# Patient Record
Sex: MALE | Race: WHITE | NOT HISPANIC OR LATINO | Employment: OTHER | URBAN - METROPOLITAN AREA
[De-identification: names, ages, dates, MRNs, and addresses within clinical notes are randomized per-mention and may not be internally consistent; named-entity substitution may affect disease eponyms.]

---

## 2017-01-06 ENCOUNTER — TELEPHONE (OUTPATIENT)
Dept: PULMONOLOGY | Facility: HOSPICE | Age: 72
End: 2017-01-06

## 2017-01-06 DIAGNOSIS — C34.90 ADENOCARCINOMA OF LUNG, UNSPECIFIED LATERALITY (HCC): ICD-10-CM

## 2017-01-06 DIAGNOSIS — J43.9 PULMONARY EMPHYSEMA, UNSPECIFIED EMPHYSEMA TYPE (HCC): ICD-10-CM

## 2017-01-11 ENCOUNTER — TELEPHONE (OUTPATIENT)
Dept: PULMONOLOGY | Facility: HOSPICE | Age: 72
End: 2017-01-11

## 2017-01-11 NOTE — TELEPHONE ENCOUNTER
Spoke to pt and he is going to be getting CT Scan done at [facility]. Called [facility] and got fax number. Faxed orders for CT over to [phone]

## 2017-04-07 ENCOUNTER — HOSPITAL ENCOUNTER (OUTPATIENT)
Dept: RADIOLOGY | Facility: MEDICAL CENTER | Age: 72
End: 2017-04-07

## 2017-04-10 ENCOUNTER — HOSPITAL ENCOUNTER (OUTPATIENT)
Dept: RADIOLOGY | Facility: MEDICAL CENTER | Age: 72
End: 2017-04-10

## 2017-04-10 ENCOUNTER — OFFICE VISIT (OUTPATIENT)
Dept: PULMONOLOGY | Facility: HOSPICE | Age: 72
End: 2017-04-10
Payer: MEDICARE

## 2017-04-10 VITALS
HEIGHT: 70 IN | DIASTOLIC BLOOD PRESSURE: 62 MMHG | HEART RATE: 69 BPM | SYSTOLIC BLOOD PRESSURE: 118 MMHG | WEIGHT: 191 LBS | BODY MASS INDEX: 27.35 KG/M2 | RESPIRATION RATE: 16 BRPM | TEMPERATURE: 98.2 F

## 2017-04-10 DIAGNOSIS — C34.01 MALIGNANT NEOPLASM OF HILUS OF RIGHT LUNG (HCC): ICD-10-CM

## 2017-04-10 DIAGNOSIS — J44.9 CHRONIC OBSTRUCTIVE PULMONARY DISEASE, UNSPECIFIED COPD TYPE (HCC): ICD-10-CM

## 2017-04-10 DIAGNOSIS — J96.11 CHRONIC HYPOXEMIC RESPIRATORY FAILURE (HCC): ICD-10-CM

## 2017-04-10 PROCEDURE — 1036F TOBACCO NON-USER: CPT | Performed by: INTERNAL MEDICINE

## 2017-04-10 PROCEDURE — G8419 CALC BMI OUT NRM PARAM NOF/U: HCPCS | Performed by: INTERNAL MEDICINE

## 2017-04-10 PROCEDURE — 3017F COLORECTAL CA SCREEN DOC REV: CPT | Mod: 8P | Performed by: INTERNAL MEDICINE

## 2017-04-10 PROCEDURE — 99214 OFFICE O/P EST MOD 30 MIN: CPT | Performed by: INTERNAL MEDICINE

## 2017-04-10 PROCEDURE — G8432 DEP SCR NOT DOC, RNG: HCPCS | Performed by: INTERNAL MEDICINE

## 2017-04-10 PROCEDURE — 1101F PT FALLS ASSESS-DOCD LE1/YR: CPT | Mod: 8P | Performed by: INTERNAL MEDICINE

## 2017-04-10 PROCEDURE — 4040F PNEUMOC VAC/ADMIN/RCVD: CPT | Performed by: INTERNAL MEDICINE

## 2017-04-10 RX ORDER — TIOTROPIUM BROMIDE 18 UG/1
18 CAPSULE ORAL; RESPIRATORY (INHALATION)
COMMUNITY
Start: 2016-10-01

## 2017-04-10 RX ORDER — ALBUTEROL SULFATE 90 UG/1
2 AEROSOL, METERED RESPIRATORY (INHALATION)
COMMUNITY
Start: 2016-10-01

## 2017-04-10 RX ORDER — TAMSULOSIN HYDROCHLORIDE 0.4 MG/1
CAPSULE ORAL
Refills: 1 | COMMUNITY
Start: 2017-02-06

## 2017-04-10 RX ORDER — MAG HYDROX/ALUMINUM HYD/SIMETH 400-400-40
450 SUSPENSION, ORAL (FINAL DOSE FORM) ORAL
COMMUNITY

## 2017-04-10 RX ORDER — TAMSULOSIN HYDROCHLORIDE 0.4 MG/1
0.4 CAPSULE ORAL
COMMUNITY
Start: 2016-10-01

## 2017-04-10 RX ORDER — FINASTERIDE 5 MG/1
5 TABLET, FILM COATED ORAL
COMMUNITY
Start: 2016-10-01

## 2017-04-10 NOTE — PROGRESS NOTES
Chief Complaint   Patient presents with   • Follow-Up     last seen about 2 years ago       HPI: This patient is a 72 y.o. Male who returns after a prolonged hiatus for follow-up of COPD and lung cancer. He underwent right thoracoscopy with segmentectomy of the right lower in August 2015. Unfortunately has had progressive disease with PET scan March 2017 showing hypermetabolic lesion in the right lower lobe infrahilar region, right upper lobe and subcarinal node. FNA biopsy in March 2017 confirmed adenocarcinoma. He saw Dr. Ganser and was deemed not a surgical candidate due to to his COPD. He has seen both oncology and radiation oncology and is considering whether he wants to pursue any treatment. His exertional dyspnea has been stable. He denies cough, wheezing, chest tightness or edema. He is compliant with Qvar 80 µg and Spiriva. He denies AECOPD over the past year. Uses supplemental oxygen with exertion and nocturnally. He wonders whether his emphysema may worsen with chemoradiation.      Past Medical History   Diagnosis Date   • Hypertension    • Other emphysema (CMS-[facility])    • Breath shortness 7/6/2015     current problem   • COPD (chronic obstructive pulmonary disease) (CMS-[facility])    • Cancer (CMS-[facility]) 7/2015     right lung   • Dental disorder      upper and lower       Social History     Social History   • Marital Status: Single     Spouse Name: N/A   • Number of Children: N/A   • Years of Education: N/A     Occupational History   • Not on file.     Social History Main Topics   • Smoking status: Former Smoker -- 1.00 packs/day for 50 years     Types: Cigarettes     Quit date: 01/01/2011   • Smokeless tobacco: Never Used   • Alcohol Use: Yes      Comment: 3x week-beer,wine,whiskey   • Drug Use: No   • Sexual Activity: Not on file     Other Topics Concern   • Not on file     Social History Narrative       Family History   Problem Relation Age of Onset   • Cancer Brother    • No Known Problems Mother      Unknown  "  • Breast Cancer Father        Current Outpatient Prescriptions on File Prior to Visit   Medication Sig Dispense Refill   • beclomethasone (QVAR) 80 MCG/ACT inhaler Inhale 1 Puff by mouth 2 times a day.     • finasteride (PROSCAR) 5 MG TABS Take 5 mg by mouth every day.     • hydrocodone-acetaminophen (NORCO) 5-325 MG Tab per tablet Take 1-2 Tabs by mouth every four hours as needed ((Pain scale 4 - 6)). 30 Tab 0   • tiotropium (SPIRIVA HANDIHALER) 18 MCG CAPS Inhale 18 mcg by mouth every day.     • albuterol (VENTOLIN OR PROVENTIL) 108 (90 BASE) MCG/ACT AERS Inhale 2 Puffs by mouth every 6 hours as needed for Shortness of Breath.     • losartan (COZAAR) 50 MG TABS Take 50 mg by mouth every day.     • aspirin (ASA) 81 MG CHEW chewable tablet Take 81 mg by mouth every day.     • SAW PALMETTO, SERENOA REPENS, PO Take  by mouth every day.       No current facility-administered medications on file prior to visit.       Allergies: Review of patient's allergies indicates no known allergies.    ROS:   Constitutional: Denies fevers, chills, night sweats, fatigue or weight loss  Eyes: Denies vision loss, pain, drainage, double vision  Ears, Nose, Throat: Denies earache, difficulty hearing, tinnitus, nasal congestion, hoarseness  Cardiovascular: Denies chest pain, tightness, palpitations, orthopnea or edema  Respiratory: As in history of present illness  Sleep: Denies daytime sleepiness, snoring, apneas, insomnia, morning headaches  GI: Denies heartburn, dysphagia, nausea, abdominal pain, diarrhea or constipation  : Denies frequent urination, hematuria, discharge or painful urination  Musculoskeletal: Denies back pain, painful joints, sore muscles  Neurological: Denies weakness or headaches, + anxiety  Skin: No rashes    Blood pressure 118/62, pulse 69, temperature 36.8 °C (98.2 °F), resp. rate 16, height 1.778 m (5' 10\"), weight 86.637 kg (191 lb).  Multi-Ox Readings  Multi Ox #1     O2 sat % at rest     O2 sat % on " exertion     O2 sat average on exertion     Multi Ox #2     O2 sat % at rest     O2 sat % on exertion     O2 sat average on exertion       Oxygen Use 3   Oxygen Frequency With exertion and nocturnal   Duration of need Lifetime   Is the patient mobile within the home? Yes   CPAP Use?     BIPAP Use?     Servo Titration         Physical Exam:  Appearance: Well-nourished, well-developed, in no acute distress  HEENT: Normocephalic, atraumatic, white sclera, PERRLA, oropharynx clear  Neck: No adenopathy or masses  Respiratory: no intercostal retractions or accessory muscle use  Lungs auscultation: Clear to auscultation bilaterally, diminished breath sounds  Cardiovascular: Regular rate rhythm. No murmurs, rubs or gallops.  No LE edema  Abdomen: soft, nondistended  Gait: Normal  Digits: No clubbing, cyanosis  Motor: No focal deficits  Orientation: Oriented to time, person and place    Diagnosis:  1. Chronic obstructive pulmonary disease, unspecified COPD type (CMS-HCC)  AMB PULMONARY FUNCTION TEST/LAB   2. Chronic hypoxemic respiratory failure (CMS-HCC)     3. 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 Clinically he appears stable however we discussed the need for updated PFTs to accurately assess his lung function. Radiation treatment could certainly worsen his pulmonary status. He is not a surgical candidate on account of his COPD.   As he is considering his treatment options, I recommend updating pulmonary function testing with close follow-up. We could certainly optimizes inhaled bronchodilators if there is evidence of decline. Continue Qvar and Spiriva in the interim.  Return for after testing.

## 2017-04-10 NOTE — MR AVS SNAPSHOT
"        Cristian Cabrera   4/10/2017 12:40 PM   Office Visit   MRN: [id number]    Department:  [facility]   Dept Phone:  [phone]    Description:  Male : 1945   Provider:  Ashwini Dela Cruz M.D.           Reason for Visit     Follow-Up last seen about 2 years ago      Allergies as of 4/10/2017     No Known Allergies      You were diagnosed with     Chronic obstructive pulmonary disease, unspecified COPD type (CMS-HCC)   [0286698]       Chronic hypoxemic respiratory failure (CMS-HCC)   [028258]       Malignant neoplasm of hilus of right lung (CMS-HCC)   [0368910]         Vital Signs     Blood Pressure Pulse Temperature Respirations Height Weight    118/62 mmHg 69 36.8 °C (98.2 °F) 16 1.778 m (5' 10\") 86.637 kg (191 lb)    Body Mass Index Smoking Status                27.41 kg/m2 Former Smoker          Basic Information     Date Of Birth Sex Race Ethnicity Preferred Language    1945 Male White Non- English      Your appointments     Apr 10, 2017  1:10 PM   Nm-Hlsotpq-Sdafdiv Film X-Ray with DX FOREIGN OUTSIDE FILM   IMAGING SUPPORT [facility] ([facility])    [address] [facility]  [place] [address]   [phone]            2017  8:00 AM   Pulmonary Function Test with PFT-RM2   [facility] (--)    [address] [place] [id number] [phone]            2017  9:00 AM   Established Patient Pul with Ashwini Dela Cruz M.D.   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   7/10/2015 - Present    Pneumothorax after biopsy J95.811   7/10/2015 - Present    Malignant neoplasm of lung (CMS-HCC) C34.90   2015 - Present      Health Maintenance        Date Due Completion Dates    IMM DTaP/Tdap/Td Vaccine (1 - Tdap) 1965 ---    COLONOSCOPY " 2/11/1996 ---    IMM ZOSTER VACCINE 2/11/2006 ---    IMM PNEUMOCOCCAL 65+ (ADULT) LOW/MEDIUM RISK SERIES (2 of 2 - PCV13) 10/1/2015 10/1/2014            Current Immunizations     Influenza TIV (IM) 10/1/2016, 10/1/2014    Pneumococcal polysaccharide vaccine (PPSV-23) 10/1/2014      Below and/or attached are the medications your provider expects you to take. Review all of your home medications and newly ordered medications with your provider and/or pharmacist. Follow medication instructions as directed by your provider and/or pharmacist. Please keep your medication list with you and share with your provider. Update the information when medications are discontinued, doses are changed, or new medications (including over-the-counter products) are added; and carry medication information at all times in the event of emergency situations     Allergies:  No Known Allergies          Medications  Valid as of: April 10, 2017 -  1:08 PM    Generic Name Brand Name Tablet Size Instructions for use    Albuterol Sulfate (Aero Soln) VENTOLIN or PROVENTIL 108 (90 BASE) MCG/ACT Inhale 2 Puffs by mouth every 6 hours as needed for Shortness of Breath.        Albuterol Sulfate (Aero Soln) albuterol 108 (90 BASE) MCG/ACT 2 Puffs by Nebulization route.        Aspirin (Chew Tab) ASA 81 MG Take 81 mg by mouth every day.        Beclomethasone Dipropionate (Aero Soln) QVAR 80 MCG/ACT Inhale 1 Puff by mouth 2 times a day.        Beclomethasone Dipropionate (Aero Soln) QVAR 80 MCG/ACT 2 Puffs by Nebulization route.        Finasteride (Tab) PROSCAR 5 MG Take 5 mg by mouth every day.        Finasteride (Tab) PROSCAR 5 MG Take 5 mg by mouth.        Hydrocodone-Acetaminophen (Tab) NORCO 5-325 MG Take 1-2 Tabs by mouth every four hours as needed ((Pain scale 4 - 6)).        Losartan Potassium (Tab) COZAAR 50 MG Take 50 mg by mouth every day.        Saw Palmetto (Serenoa repens)   Take  by mouth every day.        Saw Palmetto (Serenoa repens) (Cap)  Saw Palmetto 450 MG Take 450 mg by mouth.        Tamsulosin HCl (Cap) FLOMAX 0.4 MG Take 0.4 mg by mouth.        Tamsulosin HCl (Cap) FLOMAX 0.4 MG TAKE ONE CAPSULE BY MOUTH EVERY DAY 30 MINUTES AFTER THE SAME MEAL        Tiotropium Bromide Monohydrate (Cap) SPIRIVA 18 MCG Inhale 18 mcg by mouth every day.        Tiotropium Bromide Monohydrate (Cap) SPIRIVA 18 MCG 18 mcg by Nebulization route.        .                 Medicines prescribed today were sent to:     [facility]/PHARMACY #[id number] - [place], [place] - [address]    [address] [facility] [address]    Phone: [phone] Fax: [phone]    Open 24 Hours?: No      Medication refill instructions:       If your prescription bottle indicates you have medication refills left, it is not necessary to call your provider’s office. Please contact your pharmacy and they will refill your medication.    If your prescription bottle indicates you do not have any refills left, you may request refills at any time through one of the following ways: The online Palm system (except Urgent Care), by calling your provider’s office, or by asking your pharmacy to contact your provider’s office with a refill request. Medication refills are processed only during regular business hours and may not be available until the next business day. Your provider may request additional information or to have a follow-up visit with you prior to refilling your medication.   *Please Note: Medication refills are assigned a new Rx number when refilled electronically. Your pharmacy may indicate that no refills were authorized even though a new prescription for the same medication is available at the pharmacy. Please request the medicine by name with the pharmacy before contacting your provider for a refill.        Your To Do List     Future Labs/Procedures Complete By Expires    AMB PULMONARY FUNCTION TEST/LAB  As directed 4/10/2018         Palm Access Code: [email or web address]  Expires:  5/10/2017 12:09 PM    [employer]  A secure, online tool to manage your health information     [employer]’s [employer]® is a secure, online tool that connects you to your personalized health information from the privacy of your home -- day or night - making it very easy for you to manage your healthcare. Once the activation process is completed, you can even access your medical information using the [employer] palma, which is available for free in the Apple Palma store or Google Play store.     [employer] provides the following levels of access (as shown below):   My Chart Features   [facility] Primary Care Doctor [facility]  Specialists [facility]  Urgent  Care Non-[facility]  Primary Care  Doctor   Email your healthcare team securely and privately 24/7 X X X    Manage appointments: schedule your next appointment; view details of past/upcoming appointments X      Request prescription refills. X      View recent personal medical records, including lab and immunizations X X X X   View health record, including health history, allergies, medications X X X X   Read reports about your outpatient visits, procedures, consult and ER notes X X X X   See your discharge summary, which is a recap of your hospital and/or ER visit that includes your diagnosis, lab results, and care plan. X X       How to register for [employer]:  1. Go to  [email or web address].  2. Click on the Sign Up Now box, which takes you to the New Member Sign Up page. You will need to provide the following information:  a. Enter your [employer] Access Code exactly as it appears at the top of this page. (You will not need to use this code after you’ve completed the sign-up process. If you do not sign up before the expiration date, you must request a new code.)   b. Enter your date of birth.   c. Enter your home email address.   d. Click Submit, and follow the next screen’s instructions.  3. Create a [employer] ID. This will be your [employer] login ID and cannot be changed, so think of one  that is secure and easy to remember.  4. Create a [employer] password. You can change your password at any time.  5. Enter your Password Reset Question and Answer. This can be used at a later time if you forget your password.   6. Enter your e-mail address. This allows you to receive e-mail notifications when new information is available in [employer].  7. Click Sign Up. You can now view your health information.    For assistance activating your [employer] account, call [phone]

## 2017-04-11 ENCOUNTER — TELEPHONE (OUTPATIENT)
Dept: PULMONOLOGY | Facility: HOSPICE | Age: 72
End: 2017-04-11

## 2017-04-11 NOTE — TELEPHONE ENCOUNTER
Faxed last chart note to Josh at [facility] [phone]. [phone]

## 2017-04-13 ENCOUNTER — OFFICE VISIT (OUTPATIENT)
Dept: PULMONOLOGY | Facility: HOSPICE | Age: 72
End: 2017-04-13
Payer: MEDICARE

## 2017-04-13 ENCOUNTER — NON-PROVIDER VISIT (OUTPATIENT)
Dept: PULMONOLOGY | Facility: HOSPICE | Age: 72
End: 2017-04-13
Payer: MEDICARE

## 2017-04-13 VITALS
SYSTOLIC BLOOD PRESSURE: 118 MMHG | WEIGHT: 188 LBS | RESPIRATION RATE: 16 BRPM | BODY MASS INDEX: 26.92 KG/M2 | TEMPERATURE: 97.7 F | HEART RATE: 76 BPM | DIASTOLIC BLOOD PRESSURE: 80 MMHG | OXYGEN SATURATION: 92 % | HEIGHT: 70 IN

## 2017-04-13 DIAGNOSIS — J44.9 CHRONIC OBSTRUCTIVE PULMONARY DISEASE, UNSPECIFIED COPD TYPE (HCC): ICD-10-CM

## 2017-04-13 DIAGNOSIS — C34.01 MALIGNANT NEOPLASM OF HILUS OF RIGHT LUNG (HCC): ICD-10-CM

## 2017-04-13 PROCEDURE — 4040F PNEUMOC VAC/ADMIN/RCVD: CPT | Performed by: INTERNAL MEDICINE

## 2017-04-13 PROCEDURE — 94729 DIFFUSING CAPACITY: CPT | Performed by: INTERNAL MEDICINE

## 2017-04-13 PROCEDURE — 1101F PT FALLS ASSESS-DOCD LE1/YR: CPT | Mod: 8P | Performed by: INTERNAL MEDICINE

## 2017-04-13 PROCEDURE — 99214 OFFICE O/P EST MOD 30 MIN: CPT | Performed by: INTERNAL MEDICINE

## 2017-04-13 PROCEDURE — 94060 EVALUATION OF WHEEZING: CPT | Performed by: INTERNAL MEDICINE

## 2017-04-13 PROCEDURE — 1036F TOBACCO NON-USER: CPT | Performed by: INTERNAL MEDICINE

## 2017-04-13 PROCEDURE — 94726 PLETHYSMOGRAPHY LUNG VOLUMES: CPT | Performed by: INTERNAL MEDICINE

## 2017-04-13 PROCEDURE — G8419 CALC BMI OUT NRM PARAM NOF/U: HCPCS | Performed by: INTERNAL MEDICINE

## 2017-04-13 PROCEDURE — 3017F COLORECTAL CA SCREEN DOC REV: CPT | Mod: 8P | Performed by: INTERNAL MEDICINE

## 2017-04-13 PROCEDURE — G8432 DEP SCR NOT DOC, RNG: HCPCS | Performed by: INTERNAL MEDICINE

## 2017-04-13 ASSESSMENT — PULMONARY FUNCTION TESTS
FEV1/FVC_PERCENT_PREDICTED: 108
FEV1/FVC: 79
FEV1: 3.26
FEV1: 3.31
FVC: 4.19
FEV1_PERCENT_PREDICTED: 101
FEV1_PERCENT_PREDICTED: 103
FVC_PERCENT_PREDICTED: 93
FVC_PERCENT_PREDICTED: 95
FVC_PREDICTED: 4.38
FEV1/FVC_PERCENT_CHANGE: 100
FEV1_PERCENT_CHANGE: 1
FEV1/FVC_PERCENT_PREDICTED: 109
FEV1/FVC: 79
FVC: 4.11
FEV1/FVC_PERCENT_PREDICTED: 73
FEV1_PREDICTED: 3.21
FEV1_PERCENT_CHANGE: 1

## 2017-04-13 NOTE — MR AVS SNAPSHOT
"        Cristian Cabrera   2017 3:40 PM   Office Visit   MRN: [id number]    Department:  [facility]   Dept Phone:  [phone]    Description:  Male : 1945   Provider:  Ashwini Dela Cruz M.D.           Reason for Visit     Results PFT results.      Allergies as of 2017     No Known Allergies      You were diagnosed with     Chronic obstructive pulmonary disease, unspecified COPD type (CMS-HCC)   [[id number]]       Malignant neoplasm of hilus of right lung (CMS-HCC)   [9759725]         Vital Signs     Blood Pressure Pulse Temperature Respirations Height Weight    118/80 mmHg 76 36.5 °C (97.7 °F) 16 1.778 m (5' 10\") 85.276 kg (188 lb)    Body Mass Index Oxygen Saturation Smoking Status             26.98 kg/m2 92% Former Smoker         Basic Information     Date Of Birth Sex Race Ethnicity Preferred Language    1945 Male White Non- English      Problem List              ICD-10-CM Priority Class Noted - Resolved    Neoplasm of uncertain behavior of trachea, bronchus, 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   7/10/2015 - Present    Pneumothorax after biopsy J95.811   7/10/2015 - Present    Malignant neoplasm of lung (CMS-HCC) C34.90   2015 - Present      Health Maintenance        Date Due Completion Dates    IMM DTaP/Tdap/Td Vaccine (1 - Tdap) 1965 ---    COLONOSCOPY 1996 ---    IMM ZOSTER VACCINE 2006 ---    IMM PNEUMOCOCCAL 65+ (ADULT) LOW/MEDIUM RISK SERIES (2 of 2 - PCV13) 10/1/2015 10/1/2014            Current Immunizations     Influenza TIV (IM) 10/1/2016, 10/1/2014    Pneumococcal polysaccharide vaccine (PPSV-23) 10/1/2014      Below and/or attached are the medications your provider expects you to take. Review all of your home medications and newly ordered medications with your provider and/or pharmacist. Follow medication instructions as directed by your provider and/or pharmacist. Please keep your medication list with you and share with your provider. Update the information when " medications are discontinued, doses are changed, or new medications (including over-the-counter products) are added; and carry medication information at all times in the event of emergency situations     Allergies:  No Known Allergies          Medications  Valid as of: April 13, 2017 -  4:03 PM    Generic Name Brand Name Tablet Size Instructions for use    Albuterol Sulfate (Aero Soln) VENTOLIN or PROVENTIL 108 (90 BASE) MCG/ACT Inhale 2 Puffs by mouth every 6 hours as needed for Shortness of Breath.        Albuterol Sulfate (Aero Soln) albuterol 108 (90 BASE) MCG/ACT 2 Puffs by Nebulization route.        Aspirin (Chew Tab) ASA 81 MG Take 81 mg by mouth every day.        Beclomethasone Dipropionate (Aero Soln) QVAR 80 MCG/ACT Inhale 1 Puff by mouth 2 times a day.        Beclomethasone Dipropionate (Aero Soln) QVAR 80 MCG/ACT 2 Puffs by Nebulization route.        Finasteride (Tab) PROSCAR 5 MG Take 5 mg by mouth every day.        Finasteride (Tab) PROSCAR 5 MG Take 5 mg by mouth.        Hydrocodone-Acetaminophen (Tab) NORCO 5-325 MG Take 1-2 Tabs by mouth every four hours as needed ((Pain scale 4 - 6)).        Losartan Potassium (Tab) COZAAR 50 MG Take 50 mg by mouth every day.        Mometasone Furo-Formoterol Fum (Aerosol) Mometasone Furo-Formoterol Fum 200-5 MCG/ACT Inhale 2 Puffs by mouth 2 Times a Day.        Saw Palmetto (Serenoa repens)   Take  by mouth every day.        Saw Palmetto (Serenoa repens) (Cap) Saw Palmetto 450 MG Take 450 mg by mouth.        Tamsulosin HCl (Cap) FLOMAX 0.4 MG Take 0.4 mg by mouth.        Tamsulosin HCl (Cap) FLOMAX 0.4 MG TAKE ONE CAPSULE BY MOUTH EVERY DAY 30 MINUTES AFTER THE SAME MEAL        Tiotropium Bromide Monohydrate (Cap) SPIRIVA 18 MCG Inhale 18 mcg by mouth every day.        Tiotropium Bromide Monohydrate (Cap) SPIRIVA 18 MCG 18 mcg by Nebulization route.        .                 Medicines prescribed today were sent to:     [facility]/PHARMACY #[id number] - [place], [place] - [address]    [address] [facility] [address]    Phone: [phone] Fax: [phone]    Open 24 Hours?: No      Medication refill instructions:       If your prescription bottle indicates you have medication refills left, it is not necessary to call your provider’s office. Please contact your pharmacy and they will refill your medication.    If your prescription bottle indicates you do not have any refills left, you may request refills at any time through one of the following ways: The online BadAbroad system (except Urgent Care), by calling your provider’s office, or by asking your pharmacy to contact your provider’s office with a refill request. Medication refills are processed only during regular business hours and may not be available until the next business day. Your provider may request additional information or to have a follow-up visit with you prior to refilling your medication.   *Please Note: Medication refills are assigned a new Rx number when refilled electronically. Your pharmacy may indicate that no refills were authorized even though a new prescription for the same medication is available at the pharmacy. Please request the medicine by name with the pharmacy before contacting your provider for a refill.           BadAbroad Access Code: [email or web address]  Expires: 5/10/2017 12:09 PM    [employer]  A secure, online tool to manage your health information     [employer]’s BadAbroad® is a secure, online tool that connects you to your personalized health information from the privacy of your home -- day or night - making it very easy for you to manage your healthcare. Once the activation process is completed, you can even access your medical information using the BadAbroad palma, which is available for free in the Apple Palma store or Google Play store.     BadAbroad provides the following levels of access (as shown below):   My Chart Features   [facility] Primary Care Doctor [facility]  Specialists  [facility]  Ur[facility]  Care [facility]  Primary Care  Doctor   Email your healthcare team securely and privately 24/7 X X X    Manage appointments: schedule your next appointment; view details of past/upcoming appointments X      Request prescription refills. X      View recent personal medical records, including lab and immunizations X X X X   View health record, including health history, allergies, medications X X X X   Read reports about your outpatient visits, procedures, consult and ER notes X X X X   See your discharge summary, which is a recap of your hospital and/or ER visit that includes your diagnosis, lab results, and care plan. X X       How to register for [email or web address]:  1. Go to  [email or web address].  2. Click on the Sign Up Now box, which takes you to the New Member Sign Up page. You will need to provide the following information:  a. Enter your [email or web address] Access Code exactly as it appears at the top of this page. (You will not need to use this code after you’ve completed the sign-up process. If you do not sign up before the expiration date, you must request a new code.)   b. Enter your date of birth.   c. Enter your home email address.   d. Click Submit, and follow the next screen’s instructions.  3. Create a [email or web address] ID. This will be your [email or web address] login ID and cannot be changed, so think of one that is secure and easy to remember.  4. Create a [email or web address] password. You can change your password at any time.  5. Enter your Password Reset Question and Answer. This can be used at a later time if you forget your password.   6. Enter your e-mail address. This allows you to receive e-mail notifications when new information is available in [email or web address].  7. Click Sign Up. You can now view your health information.    For assistance activating your [email or web address] account, call [phone]

## 2017-04-13 NOTE — PROGRESS NOTES
Chief Complaint   Patient presents with   • Results     PFT results.         HPI: This patient is a 72 y.o. Male who returns for follow-up of COPD and lung cancer. He underwent right thoracoscopy with segmentectomy of the right lower in August 2015. Unfortunately he has progressive disease with PET scan March 2017 showing hypermetabolic lesion in the right lower lobe infrahilar region, right upper lobe and subcarinal node. FNA biopsy in March 2017 confirmed adenocarcinoma. He saw Dr. Ganser and was deemed not a surgical candidate due to his COPD. He has seen both oncology and radiation oncology and is considering whether he wants to pursue any treatment. His exertional dyspnea has been stable. He denies cough, wheezing, chest tightness or edema. He is compliant with Qvar 80 µg and Spiriva. He denies AECOPD over the past year. Uses supplemental oxygen with exertion and nocturnally. PFTs preoperatively showed DLCO 56%. Updated PFTs today show DLCO 41%, normal FEV1 at 3.31 L or 103%, normal total lung capacity 93%.      Past Medical History   Diagnosis Date   • Hypertension    • Other emphysema (CMS-HCC)    • Breath shortness 7/6/2015     current problem   • COPD (chronic obstructive pulmonary disease) (CMS-HCC)    • Cancer (CMS-HCC) 7/2015     right lung   • Dental disorder      upper and lower       Social History     Social History   • Marital Status: Single     Spouse Name: N/A   • Number of Children: N/A   • Years of Education: N/A     Occupational History   • Not on file.     Social History Main Topics   • Smoking status: Former Smoker -- 1.00 packs/day for 50 years     Types: Cigarettes     Quit date: 01/01/2011   • Smokeless tobacco: Never Used   • Alcohol Use: Yes      Comment: 3x week-beer,wine,whiskey   • Drug Use: No   • Sexual Activity: Not on file     Other Topics Concern   • Not on file     Social History Narrative       Family History   Problem Relation Age of Onset   • Cancer Brother    • No Known  Problems Mother      Unknown   • Breast Cancer Father        Current Outpatient Prescriptions on File Prior to Visit   Medication Sig Dispense Refill   • tamsulosin (FLOMAX) 0.4 MG capsule Take 0.4 mg by mouth.     • Saw Palmetto 450 MG Cap Take 450 mg by mouth.     • tiotropium (SPIRIVA HANDIHALER) 18 MCG Cap 18 mcg by Nebulization route.     • hydrocodone-acetaminophen (NORCO) 5-325 MG Tab per tablet Take 1-2 Tabs by mouth every four hours as needed ((Pain scale 4 - 6)). 30 Tab 0   • beclomethasone (QVAR) 80 MCG/ACT inhaler Inhale 1 Puff by mouth 2 times a day.     • finasteride (PROSCAR) 5 MG TABS Take 5 mg by mouth every day.     • tamsulosin (FLOMAX) 0.4 MG capsule TAKE ONE CAPSULE BY MOUTH EVERY DAY 30 MINUTES AFTER THE SAME MEAL  1   • albuterol (PROAIR HFA) 108 (90 BASE) MCG/ACT Aero Soln inhalation aerosol 2 Puffs by Nebulization route.     • beclomethasone (QVAR) 80 MCG/ACT inhaler 2 Puffs by Nebulization route.     • finasteride (PROSCAR) 5 MG Tab Take 5 mg by mouth.     • tiotropium (SPIRIVA HANDIHALER) 18 MCG CAPS Inhale 18 mcg by mouth every day.     • albuterol (VENTOLIN OR PROVENTIL) 108 (90 BASE) MCG/ACT AERS Inhale 2 Puffs by mouth every 6 hours as needed for Shortness of Breath.     • losartan (COZAAR) 50 MG TABS Take 50 mg by mouth every day.     • aspirin (ASA) 81 MG CHEW chewable tablet Take 81 mg by mouth every day.     • SAW PALMETTO, SERENOA REPENS, PO Take  by mouth every day.       No current facility-administered medications on file prior to visit.       Allergies: Review of patient's allergies indicates no known allergies.    ROS:   Constitutional: Denies fevers, chills, night sweats, fatigue or weight loss  Eyes: Denies vision loss, pain, drainage, double vision  Ears, Nose, Throat: Denies earache, difficulty hearing, tinnitus, nasal congestion, hoarseness  Cardiovascular: Denies chest pain, tightness, palpitations, orthopnea or edema  Respiratory: As in history of present  "illness  Sleep: Denies daytime sleepiness, snoring, apneas, insomnia, morning headaches  GI: Denies heartburn, dysphagia, nausea, abdominal pain, diarrhea or constipation  : Denies frequent urination, hematuria, discharge or painful urination  Musculoskeletal: Denies back pain, painful joints, sore muscles  Neurological: Denies weakness or headaches  Skin: No rashes    Blood pressure 118/80, pulse 76, temperature 36.5 °C (97.7 °F), resp. rate 16, height 1.778 m (5' 10\"), weight 85.276 kg (188 lb), SpO2 92 %.  Multi-Ox Readings  Multi Ox #1     O2 sat % at rest     O2 sat % on exertion     O2 sat average on exertion     Multi Ox #2     O2 sat % at rest     O2 sat % on exertion     O2 sat average on exertion       Oxygen Use     Oxygen Frequency     Duration of need     Is the patient mobile within the home?     CPAP Use?     BIPAP Use?     Servo Titration         Physical Exam:  Appearance: Well-nourished, well-developed, in no acute distress  HEENT: Normocephalic, atraumatic, white sclera, PERRLA, oropharynx clear  Neck: No adenopathy or masses  Respiratory: no intercostal retractions or accessory muscle use  Lungs auscultation: Clear to auscultation bilaterally, diminished breath sounds  Cardiovascular: Regular rate rhythm. No murmurs, rubs or gallops.  No LE edema  Abdomen: soft, nondistended  Gait: Normal  Digits: No clubbing, cyanosis  Motor: No focal deficits  Orientation: Oriented to time, person and place    Diagnosis:  1. Chronic obstructive pulmonary disease, unspecified COPD type (CMS-HCC)  Mometasone Furo-Formoterol Fum (DULERA) 200-5 MCG/ACT Aerosol    OT PULMOMARY REHAB   2. Malignant neoplasm of hilus of right lung (CMS-HCC)         Plan:  Clinically, the patient COPD is stable. His DLCO has diminished as would be expected postoperatively and with aging, however he is quite physically functional despite his emphysema.  I encouraged him to discuss his lung cancer and chemoradiation with his " oncologist, Dr. Oro and Herman, as he has many questions regarding anticipated response rate, mortality etc.  In terms of his emphysema, I recommend optimizing his inhaled bronchodilators by switching Qvar to Dulera 200ug 2 puffs BID. Continue Spiriva one puff daily.  Continue oxygen at 2 L/m with exertion and nocturnally.  We will also refer to pulmonary rehabilitation which he was very interested in.   Return for after Pulm rehab.

## 2017-04-13 NOTE — PROCEDURES
Good patient effort & cooperation.  The DLCO was uncorrected for Hgb.  A bronchodilatror of Xopenex HFA- 2puffs via spacer were administered  ATS/ERS standards for acceptability & reproducibility were not met for FVC because of back extrapolation but were repeatable.    The FVC is 4.19 L or 95%, FEV1 is 3.31 L or 103%, FEV1/FVC 79%. TLC 93%. DLCO 41%. No bronchodilator response.    Interpretation:  Severe diffusion impairment consistent with emphysema.  Normal spirometry and lung volumes.  Lack of bronchodilator response does not preclude using inhalers when clinically indicated.

## 2017-04-13 NOTE — MR AVS SNAPSHOT
Cristian Cabrera   2017 2:30 PM   Non-Provider Visit   MRN: [id number]    Department:  [facility]   Dept Phone:  [phone]    Description:  Male : 1945   Provider:  [id number]           Reason for Visit     COPD           Allergies as of 2017     No Known Allergies      You were diagnosed with     Chronic obstructive pulmonary disease, unspecified COPD type (CMS-HCC)   [2257232]         Vital Signs     Smoking Status                   Former Smoker           Basic Information     Date Of Birth Sex Race Ethnicity Preferred Language    1945 Male White Non- English      Your appointments     2017  3:40 PM   Established Patient Pul with Ashwini Dela Cruz M.D.   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   7/10/2015 - Present    Pneumothorax after biopsy J95.811   7/10/2015 - Present    Malignant neoplasm of lung (CMS-HCC) C34.90   2015 - Present      Health Maintenance        Date Due Completion Dates    IMM DTaP/Tdap/Td Vaccine (1 - Tdap) 1965 ---    COLONOSCOPY 1996 ---    IMM ZOSTER VACCINE 2006 ---    IMM PNEUMOCOCCAL 65+ (ADULT) LOW/MEDIUM RISK SERIES (2 of 2 - PCV13) 10/1/2015 10/1/2014            Current Immunizations     Influenza TIV (IM) 10/1/2016, 10/1/2014    Pneumococcal polysaccharide vaccine (PPSV-23) 10/1/2014      Below and/or attached are the medications your provider expects you to take. Review all of your home medications and newly ordered medications with your provider and/or pharmacist. Follow medication instructions as directed by your provider and/or pharmacist. Please keep your medication list with you and share with your provider. Update the information when medications are discontinued, doses are changed, or  new medications (including over-the-counter products) are added; and carry medication information at all times in the event of emergency situations     Allergies:  No Known Allergies          Medications  Valid as of: April 13, 2017 -  3:38 PM    Generic Name Brand Name Tablet Size Instructions for use    Albuterol Sulfate (Aero Soln) VENTOLIN or PROVENTIL 108 (90 BASE) MCG/ACT Inhale 2 Puffs by mouth every 6 hours as needed for Shortness of Breath.        Albuterol Sulfate (Aero Soln) albuterol 108 (90 BASE) MCG/ACT 2 Puffs by Nebulization route.        Aspirin (Chew Tab) ASA 81 MG Take 81 mg by mouth every day.        Beclomethasone Dipropionate (Aero Soln) QVAR 80 MCG/ACT Inhale 1 Puff by mouth 2 times a day.        Beclomethasone Dipropionate (Aero Soln) QVAR 80 MCG/ACT 2 Puffs by Nebulization route.        Finasteride (Tab) PROSCAR 5 MG Take 5 mg by mouth every day.        Finasteride (Tab) PROSCAR 5 MG Take 5 mg by mouth.        Hydrocodone-Acetaminophen (Tab) NORCO 5-325 MG Take 1-2 Tabs by mouth every four hours as needed ((Pain scale 4 - 6)).        Losartan Potassium (Tab) COZAAR 50 MG Take 50 mg by mouth every day.        Saw Palmetto (Serenoa repens)   Take  by mouth every day.        Saw Palmetto (Serenoa repens) (Cap) Saw Palmetto 450 MG Take 450 mg by mouth.        Tamsulosin HCl (Cap) FLOMAX 0.4 MG Take 0.4 mg by mouth.        Tamsulosin HCl (Cap) FLOMAX 0.4 MG TAKE ONE CAPSULE BY MOUTH EVERY DAY 30 MINUTES AFTER THE SAME MEAL        Tiotropium Bromide Monohydrate (Cap) SPIRIVA 18 MCG Inhale 18 mcg by mouth every day.        Tiotropium Bromide Monohydrate (Cap) SPIRIVA 18 MCG 18 mcg by Nebulization route.        .                 Medicines prescribed today were sent to:     [id number] - [place], [place] - [address]    [address] [facility] [address]    Phone: [phone] Fax: [phone]    Open 24 Hours?: No      Medication refill instructions:       If your prescription  bottle indicates you have medication refills left, it is not necessary to call your provider’s office. Please contact your pharmacy and they will refill your medication.    If your prescription bottle indicates you do not have any refills left, you may request refills at any time through one of the following ways: The online Ep[employer]it system (except Urgent Care), by calling your provider’s office, or by asking your pharmacy to contact your provider’s office with a refill request. Medication refills are processed only during regular business hours and may not be available until the next business day. Your provider may request additional information or to have a follow-up visit with you prior to refilling your medication.   *Please Note: Medication refills are assigned a new Rx number when refilled electronically. Your pharmacy may indicate that no refills were authorized even though a new prescription for the same medication is available at the pharmacy. Please request the medicine by name with the pharmacy before contacting your provider for a refill.           Epunchit Access Code: [email or web address]  Expires: 5/10/2017 12:09 PM    Epunchit  A secure, online tool to manage your health information     [employer]’s Epunchit® is a secure, online tool that connects you to your personalized health information from the privacy of your home -- day or night - making it very easy for you to manage your healthcare. Once the activation process is completed, you can even access your medical information using the Epunchit palma, which is available for free in the Apple Palma store or Google Play store.     Epunchit provides the following levels of access (as shown below):   My Chart Features   [facility] Doctor [facility]  Urgent  Care Non-[facility]  Primary Care  Doctor   Email your healthcare team securely and privately 24/7 X X X    Manage appointments: schedule your next appointment; view details of  past/upcoming appointments X      Request prescription refills. X      View recent personal medical records, including lab and immunizations X X X X   View health record, including health history, allergies, medications X X X X   Read reports about your outpatient visits, procedures, consult and ER notes X X X X   See your discharge summary, which is a recap of your hospital and/or ER visit that includes your diagnosis, lab results, and care plan. X X       How to register for [employer]:  1. Go to  [email or web address].  2. Click on the Sign Up Now box, which takes you to the New Member Sign Up page. You will need to provide the following information:  a. Enter your [employer] Access Code exactly as it appears at the top of this page. (You will not need to use this code after you’ve completed the sign-up process. If you do not sign up before the expiration date, you must request a new code.)   b. Enter your date of birth.   c. Enter your home email address.   d. Click Submit, and follow the next screen’s instructions.  3. Create a [employer] ID. This will be your [employer] login ID and cannot be changed, so think of one that is secure and easy to remember.  4. Create a [employer] password. You can change your password at any time.  5. Enter your Password Reset Question and Answer. This can be used at a later time if you forget your password.   6. Enter your e-mail address. This allows you to receive e-mail notifications when new information is available in [employer].  7. Click Sign Up. You can now view your health information.    For assistance activating your [employer] account, call [phone]

## 2017-09-29 ENCOUNTER — TELEPHONE (OUTPATIENT)
Dept: PULMONOLOGY | Facility: HOSPICE | Age: 72
End: 2017-09-29